# Patient Record
Sex: FEMALE | Race: WHITE
[De-identification: names, ages, dates, MRNs, and addresses within clinical notes are randomized per-mention and may not be internally consistent; named-entity substitution may affect disease eponyms.]

---

## 2023-08-17 ENCOUNTER — HOSPITAL ENCOUNTER (EMERGENCY)
Dept: HOSPITAL 75 - ER | Age: 23
Discharge: HOME | End: 2023-08-17
Payer: COMMERCIAL

## 2023-08-17 VITALS — WEIGHT: 279.99 LBS | BODY MASS INDEX: 46.65 KG/M2 | HEIGHT: 64.96 IN

## 2023-08-17 VITALS — SYSTOLIC BLOOD PRESSURE: 133 MMHG | DIASTOLIC BLOOD PRESSURE: 79 MMHG
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
## 2023-08-17 DIAGNOSIS — Z28.310: ICD-10-CM

## 2023-08-17 DIAGNOSIS — S22.050A: ICD-10-CM

## 2023-08-17 DIAGNOSIS — S22.040A: Primary | ICD-10-CM

## 2023-08-17 DIAGNOSIS — M25.572: ICD-10-CM

## 2023-08-17 PROCEDURE — 72125 CT NECK SPINE W/O DYE: CPT

## 2023-08-17 PROCEDURE — 73610 X-RAY EXAM OF ANKLE: CPT

## 2023-08-17 PROCEDURE — 72128 CT CHEST SPINE W/O DYE: CPT

## 2023-08-17 PROCEDURE — 71260 CT THORAX DX C+: CPT

## 2023-08-17 PROCEDURE — 72131 CT LUMBAR SPINE W/O DYE: CPT

## 2023-08-17 PROCEDURE — 70450 CT HEAD/BRAIN W/O DYE: CPT
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
